# Patient Record
(demographics unavailable — no encounter records)

---

## 2025-02-05 NOTE — ASSESSMENT
[Palliative Care Plan] : not applicable at this time [FreeTextEntry1] : August 09, 2024 -   Patient is an 84 y/o elderly Post menopausal black female, w h/o HTN, HLD, Cerebral microvascular disease, CAD, CHF, Hypokalemia, Atrial Fibrillation on low dose Eliquis, Pulmonary Embolus & Left Leg DVT March 3, 2023 @ [facility], Anemia, h/o blood transfusion.  Labs from April 19, 2024 - Hgb = 10.8, MCV = 81.0 w Normal diff   Iron studies WNL, Ferritin = 432 (H) - remains stable likely reactive   Today's CBC - WBC = 9.07, Hgb = 11.0, PLT = 200 000, MCV = 81.2 - results d/w pt copy given Ferritin, Iron studies, B12/ Folate results pending  1) Normocytic Anemia - Multifactorial - STEVE, AOCD - Continue Oral Fe 1 tab po qd w Vitamin C / OJ or citrus, incorporate Iron rich vegetables with citrus absorption, other iron rich foods in diet. Continue Folic Acid daily as directed. Prescriptions w refills sent to local pharmacy  2) MIPS Measures Questionnaire Completed    3) Pulmonary Embolus / Left Leg DVT March 3, 2023 & Atrial Fibrillation - continue Eliquis 2.5 mg PO bid  4) PCP and Cardiology & Other providers f/u was advised - as per their discretion.   Medication compliance advised, all questions, concerns were addressed with patient during this visit to her satisfaction, she verbalized understanding. RTC in 6 months or sooner as needed. Case management, care plan d/w Dr. Gentry Atwood  _________________ February 05, 2025 - Patient is an 84 y/o elderly Post menopausal black female, w h/o HTN, HLD, Cerebral microvascular disease, CAD on Brillinta, CHF, Hypokalemia, Atrial Fibrillation on low dose Eliquis, Pulmonary Embolus & Left Leg DVT March 3, 2023 @ [facility], Anemia, h/o blood transfusion.  Labs from August 09, 2024 - CBC - WBC = 9.07, Hgb = 11.0, PLT = 200 000, MCV = 81.2 Ferritin = 330, Iron = 57, TIBC = 284, % Fe Saturation = 20%,   B12 = 692,  Folate > 20.0  Today's CBC > WBC = 7.54, Hgb = 11.7 PLT = 199 000, MCV = 81.4 w Normal Diff &  Normal Retic Count Ferritin, Iron studies, B12 / Folate results pending  1) Anemia resolved - Continue Oral iron w Vitamin C, iron, B12 & Folate rich foods  Prescriptions for Iron & Vitamin C renewed  2) Pulmonary Embolus / Left Leg DVT March 3, 2023 - completed course of Anticoagulation   3) h/o CAD, CHF, Atrial Fibrillation on Brillinta & low dose Eliquis - continue Eliquis 2.5 mg PO bid - f/u w Cardiology  4) PCP and Cardiology & Other providers f/u was advised - as per their discretion.   Medication compliance advised, all questions, concerns were addressed with patient during this visit to her satisfaction, she verbalized understanding. RTC in 6 months or sooner as needed. Case management, care plan d/w Dr. Gentry Atwood

## 2025-02-05 NOTE — PHYSICAL EXAM
[Ambulatory and capable of all self care but unable to carry out any work activities] : Status 2- Ambulatory and capable of all self care but unable to carry out any work activities. Up and about more than 50% of waking hours [Normal] : affect appropriate [de-identified] : pt wore a face mask during visit, weight remains stable, [de-identified] : wears eyeglasses

## 2025-02-05 NOTE — HISTORY OF PRESENT ILLNESS
[Date: ____________] : Patient's last distress assessment performed on [unfilled]. [0 - No Distress] : Distress Level: 0 [90: Able to carry normal activity; minor signs or symptoms of disease.] : 90: Able to carry normal activity; minor signs or symptoms of disease.  [ECOG Performance Status: 1 - Restricted in physically strenuous activity but ambulatory and able to carry out work of a light or sedentary nature] : Performance Status: 1 - Restricted in physically strenuous activity but ambulatory and able to carry out work of a light or sedentary nature, e.g., light house work, office work [de-identified] : Deedee Oneill is a 82 year old seen today for first [facility] hematology visit: \par  02/10/2023 admission to [facility] for treatment of congestive heart failure. She was first told of hypertension, high cholesterol and atrial fibrillation at that time. No myocardial infarction she was diagnosed to have coronary artery disease. 2 coronary stents were placed. She had a left lower leg deep venous thrombosis and a unilateral pulmonary embolus left lower lobe and possibly in right lower lobe. CT of brain showed microvascular ischemia\par  She did not have COVID 19.\par  She received 3 blood transfusions. No prior blood transfusions\par  pulmonary emboli treated with anticoagulation. Discharge from [facility] 03/03/2023 and transferred to [facility] ; she remained in rehabilitation 04/04/2023.\par  There is no family history of blood clotting abnormality [FreeTextEntry1] : Oral Iron  [de-identified] : see hpi  June 16, 2023 - 2nd visit - Seen in a f/u visit today, ambulates with a walker, presented to the office by herself, son waiting outside drove her to this apptn. She feels well remains on Eliquis, Folic Acid, oral Fe takes it daily with crackers. Denies bleeding, bruising, interval change in medical status, febrile illness.  September 15, 2023 - 3rd Visit - Seen in a f/u visit, presented to the office ambulating with a walker, a family relative drove her to this appointment.  She has been feeling well, no falls, no bleeding, bruising, febrile illness, hospitalizations, change in medications, swollen glands, dizziness, fatigue, weight loss.  She remains on Oral Fe with OJ, Folic Acid.  PE, Left Lower Leg DVT on March 3, 2023 & has h/o pAfib remains on Eliquis 2.5 mg PO bid. Also on Brilinta 90 mg PO bid for CAD.   She was seen by PCP Dr. Wally Wilkinson yesterday and medications were renewed. She has a referral to see a Cardiologist has not made an appointment as of yet.   December 15, 2023 - Seen in a f/u visit today daughter drove her to the appointment waiting outside. Patient ambulates with a walker. Feels well denies any falls, bleeding, bruising, febrile illness, interval change in medical status, hospitalization.  She reports compliance with taking oral iron with OJ also takes Vitamin C separately, She remains on Folic Acid, Eliquis & Brillinta for CAD, Afib  Has not seen a Cardiologist as of yet plans to make an apptn next year as her new insurance will go into effect on January 2024.  April 19, 2024 - Seen in a f/u visit today, ambulates with a walker for assistance. Feels well, lives with daughter.  No interval change in medical status, febrile illness, hospitalization, bleeding, bruising, blood transfusions. Weight remains stable, reports compliance with oral iron with OJ / Vitamin C.  Remains on w h/o CAD, Afib on Brilinta & Eliquis.  _____ August 09, 2204 - Seen in a f/u visit today, ambulated with a cane. Denies interval change in medical status, hospitalization, blood transfusion, bleeding, bruising, febrile illness.  Reports compliance with taking medications, and follow up with physicians. Came to the visit today by [employer], she has self surrendered her drivers license.  ___________ February 05, 2025 - Seen in a f/u visit today. Feels well, reports no febrile illness, hospitalization, interval change in medical status, no bleeding, bruising, falls,  able to perform ADLs remains active, actively participates with family events, and planning visiting her family during early spring.

## 2025-02-05 NOTE — REASON FOR VISIT
[Follow-Up Visit] : a follow-up visit for [Blood Count Assessment] : blood count assessment [Family Member] : family member [Other: _____] : [unfilled] [FreeTextEntry2] : Anemia STEVE

## 2025-02-05 NOTE — REVIEW OF SYSTEMS
[Recent Change In Weight] : ~T recent weight change [Palpitations] : palpitations [Difficulty Walking] : difficulty walking [Negative] : Heme/Lymph [Fever] : no fever [Chills] : no chills [Night Sweats] : no night sweats [Fatigue] : no fatigue [Dry Eyes] : no dryness of the eyes [Dysphagia] : no dysphagia [Loss of Hearing] : no loss of hearing [Nosebleeds] : no nosebleeds [Hoarseness] : no hoarseness [Odynophagia] : no odynophagia [Chest Pain] : no chest pain [Leg Claudication] : no intermittent leg claudication [Lower Ext Edema] : no lower extremity edema [Shortness Of Breath] : no shortness of breath [Wheezing] : no wheezing [Cough] : no cough [SOB on Exertion] : no shortness of breath during exertion [Abdominal Pain] : no abdominal pain [Vomiting] : no vomiting [Constipation] : no constipation [Dysuria] : no dysuria [Incontinence] : no incontinence [Vaginal Discharge] : no vaginal discharge [Dysmenorrhea/Abn Vaginal Bleeding] : no dysmenorrhea/abnormal vaginal bleeding [Joint Pain] : no joint pain [Joint Stiffness] : no joint stiffness [Muscle Pain] : no muscle pain [Muscle Weakness] : no muscle weakness [Skin Rash] : no skin rash [Skin Wound] : no skin wound [Confused] : no confusion [Dizziness] : no dizziness [Fainting] : no fainting [FreeTextEntry2] : weight remains stable  [FreeTextEntry3] : no visual disturbances, blurred vision  [FreeTextEntry5] : h/o CAD, Afib on Brillinta & Eliquis low dose  [FreeTextEntry6] : h/o PE  [FreeTextEntry7] : denies GIB, no nausea, vomiting [FreeTextEntry8] : no hematuria  [FreeTextEntry9] : ambulates with a cane reports no falls  [de-identified] :  reports no falls, no headaches, visual disturbances, gait issues

## 2025-02-05 NOTE — ASSESSMENT
[Palliative Care Plan] : not applicable at this time [FreeTextEntry1] : August 09, 2024 -   Patient is an 82 y/o elderly Post menopausal black female, w h/o HTN, HLD, Cerebral microvascular disease, CAD, CHF, Hypokalemia, Atrial Fibrillation on low dose Eliquis, Pulmonary Embolus & Left Leg DVT March 3, 2023 @ [facility], Anemia, h/o blood transfusion.  Labs from April 19, 2024 - Hgb = 10.8, MCV = 81.0 w Normal diff   Iron studies WNL, Ferritin = 432 (H) - remains stable likely reactive   Today's CBC - WBC = 9.07, Hgb = 11.0, PLT = 200 000, MCV = 81.2 - results d/w pt copy given Ferritin, Iron studies, B12/ Folate results pending  1) Normocytic Anemia - Multifactorial - STEVE, AOCD - Continue Oral Fe 1 tab po qd w Vitamin C / OJ or citrus, incorporate Iron rich vegetables with citrus absorption, other iron rich foods in diet. Continue Folic Acid daily as directed. Prescriptions w refills sent to local pharmacy  2) MIPS Measures Questionnaire Completed    3) Pulmonary Embolus / Left Leg DVT March 3, 2023 & Atrial Fibrillation - continue Eliquis 2.5 mg PO bid  4) PCP and Cardiology & Other providers f/u was advised - as per their discretion.   Medication compliance advised, all questions, concerns were addressed with patient during this visit to her satisfaction, she verbalized understanding. RTC in 6 months or sooner as needed. Case management, care plan d/w Dr. Gentry Atwood  _________________ February 05, 2025 - Patient is an 82 y/o elderly Post menopausal black female, w h/o HTN, HLD, Cerebral microvascular disease, CAD on Brillinta, CHF, Hypokalemia, Atrial Fibrillation on low dose Eliquis, Pulmonary Embolus & Left Leg DVT March 3, 2023 @ [facility], Anemia, h/o blood transfusion.  Labs from August 09, 2024 - CBC - WBC = 9.07, Hgb = 11.0, PLT = 200 000, MCV = 81.2 Ferritin = 330, Iron = 57, TIBC = 284, % Fe Saturation = 20%,   B12 = 692,  Folate > 20.0  Today's CBC > WBC = 7.54, Hgb = 11.7 PLT = 199 000, MCV = 81.4 w Normal Diff &  Normal Retic Count Ferritin, Iron studies, B12 / Folate results pending  1) Anemia resolved - Continue Oral iron w Vitamin C, iron, B12 & Folate rich foods  Prescriptions for Iron & Vitamin C renewed  2) Pulmonary Embolus / Left Leg DVT March 3, 2023 - completed course of Anticoagulation   3) h/o CAD, CHF, Atrial Fibrillation on Brillinta & low dose Eliquis - continue Eliquis 2.5 mg PO bid - f/u w Cardiology  4) PCP and Cardiology & Other providers f/u was advised - as per their discretion.   Medication compliance advised, all questions, concerns were addressed with patient during this visit to her satisfaction, she verbalized understanding. RTC in 6 months or sooner as needed. Case management, care plan d/w Dr. Gentry Atwood

## 2025-02-05 NOTE — REVIEW OF SYSTEMS
[Recent Change In Weight] : ~T recent weight change [Palpitations] : palpitations [Difficulty Walking] : difficulty walking [Negative] : Heme/Lymph [Fever] : no fever [Chills] : no chills [Night Sweats] : no night sweats [Fatigue] : no fatigue [Dry Eyes] : no dryness of the eyes [Dysphagia] : no dysphagia [Loss of Hearing] : no loss of hearing [Nosebleeds] : no nosebleeds [Hoarseness] : no hoarseness [Odynophagia] : no odynophagia [Chest Pain] : no chest pain [Leg Claudication] : no intermittent leg claudication [Lower Ext Edema] : no lower extremity edema [Shortness Of Breath] : no shortness of breath [Wheezing] : no wheezing [Cough] : no cough [SOB on Exertion] : no shortness of breath during exertion [Abdominal Pain] : no abdominal pain [Vomiting] : no vomiting [Constipation] : no constipation [Dysuria] : no dysuria [Incontinence] : no incontinence [Vaginal Discharge] : no vaginal discharge [Dysmenorrhea/Abn Vaginal Bleeding] : no dysmenorrhea/abnormal vaginal bleeding [Joint Pain] : no joint pain [Joint Stiffness] : no joint stiffness [Muscle Pain] : no muscle pain [Muscle Weakness] : no muscle weakness [Skin Rash] : no skin rash [Skin Wound] : no skin wound [Confused] : no confusion [Dizziness] : no dizziness [Fainting] : no fainting [FreeTextEntry2] : weight remains stable  [FreeTextEntry3] : no visual disturbances, blurred vision  [FreeTextEntry5] : h/o CAD, Afib on Brillinta & Eliquis low dose  [FreeTextEntry6] : h/o PE  [FreeTextEntry7] : denies GIB, no nausea, vomiting [FreeTextEntry8] : no hematuria  [FreeTextEntry9] : ambulates with a cane reports no falls  [de-identified] :  reports no falls, no headaches, visual disturbances, gait issues

## 2025-02-05 NOTE — PHYSICAL EXAM
[Ambulatory and capable of all self care but unable to carry out any work activities] : Status 2- Ambulatory and capable of all self care but unable to carry out any work activities. Up and about more than 50% of waking hours [Normal] : affect appropriate [de-identified] : pt wore a face mask during visit, weight remains stable, [de-identified] : wears eyeglasses

## 2025-02-05 NOTE — HISTORY OF PRESENT ILLNESS
[Date: ____________] : Patient's last distress assessment performed on [unfilled]. [0 - No Distress] : Distress Level: 0 [90: Able to carry normal activity; minor signs or symptoms of disease.] : 90: Able to carry normal activity; minor signs or symptoms of disease.  [ECOG Performance Status: 1 - Restricted in physically strenuous activity but ambulatory and able to carry out work of a light or sedentary nature] : Performance Status: 1 - Restricted in physically strenuous activity but ambulatory and able to carry out work of a light or sedentary nature, e.g., light house work, office work [de-identified] : Deedee Oneill is a 82 year old seen today for first [facility] hematology visit: \par  02/10/2023 admission to [facility] for treatment of congestive heart failure. She was first told of hypertension, high cholesterol and atrial fibrillation at that time. No myocardial infarction she was diagnosed to have coronary artery disease. 2 coronary stents were placed. She had a left lower leg deep venous thrombosis and a unilateral pulmonary embolus left lower lobe and possibly in right lower lobe. CT of brain showed microvascular ischemia\par  She did not have COVID 19.\par  She received 3 blood transfusions. No prior blood transfusions\par  pulmonary emboli treated with anticoagulation. Discharge from [facility] 03/03/2023 and transferred to [facility] ; she remained in rehabilitation 04/04/2023.\par  There is no family history of blood clotting abnormality [FreeTextEntry1] : Oral Iron  [de-identified] : see hpi  June 16, 2023 - 2nd visit - Seen in a f/u visit today, ambulates with a walker, presented to the office by herself, son waiting outside drove her to this apptn. She feels well remains on Eliquis, Folic Acid, oral Fe takes it daily with crackers. Denies bleeding, bruising, interval change in medical status, febrile illness.  September 15, 2023 - 3rd Visit - Seen in a f/u visit, presented to the office ambulating with a walker, a family relative drove her to this appointment.  She has been feeling well, no falls, no bleeding, bruising, febrile illness, hospitalizations, change in medications, swollen glands, dizziness, fatigue, weight loss.  She remains on Oral Fe with OJ, Folic Acid.  PE, Left Lower Leg DVT on March 3, 2023 & has h/o pAfib remains on Eliquis 2.5 mg PO bid. Also on Brilinta 90 mg PO bid for CAD.   She was seen by PCP Dr. Wally Wilkinson yesterday and medications were renewed. She has a referral to see a Cardiologist has not made an appointment as of yet.   December 15, 2023 - Seen in a f/u visit today daughter drove her to the appointment waiting outside. Patient ambulates with a walker. Feels well denies any falls, bleeding, bruising, febrile illness, interval change in medical status, hospitalization.  She reports compliance with taking oral iron with OJ also takes Vitamin C separately, She remains on Folic Acid, Eliquis & Brillinta for CAD, Afib  Has not seen a Cardiologist as of yet plans to make an apptn next year as her new insurance will go into effect on January 2024.  April 19, 2024 - Seen in a f/u visit today, ambulates with a walker for assistance. Feels well, lives with daughter.  No interval change in medical status, febrile illness, hospitalization, bleeding, bruising, blood transfusions. Weight remains stable, reports compliance with oral iron with OJ / Vitamin C.  Remains on w h/o CAD, Afib on Brilinta & Eliquis.  _____ August 09, 2204 - Seen in a f/u visit today, ambulated with a cane. Denies interval change in medical status, hospitalization, blood transfusion, bleeding, bruising, febrile illness.  Reports compliance with taking medications, and follow up with physicians. Came to the visit today by [employer], she has self surrendered her drivers license.  ___________ February 05, 2025 - Seen in a f/u visit today. Feels well, reports no febrile illness, hospitalization, interval change in medical status, no bleeding, bruising, falls,  able to perform ADLs remains active, actively participates with family events, and planning visiting her family during early spring.

## 2025-02-05 NOTE — RESULTS/DATA
[FreeTextEntry1] : review of information in the EM HR CBC WBC 6.67 HGB 9.9 MCV 82  000 copy of the report printed and given to doyle with an explanation April 12, 2023 - Anticardiolipin Antibody - Negative, APC Resistance Assay - WNL   June 16, 2023 - CBC - WBC = 6.04, Hgb = 10.4, PLT = 217, MCV = 76.1 with normal differential - d/w pt copy provided   Dec 15, 2023 CBC - WBC = 5.9 Hgb = 10.3, PLT = 242, MCV = 79.2 with normal differential - d/w pt copy provided   April 19, 2024 - CBC - WBC = 8.06, Hgb = 10.8, PLT = 232, MCV = 81.0 w Normal diff - d/w pt copy given  Ferritin = 432 (H),  Iron studies WNL ,  _________ August 09, 2024 - CBC - WBC = 9.07, Hgb = 11.0, PLT = 200 000, MCV = 81.2 Ferritin = 330, Iron = 57, TIBC = 284, % Fe Saturation = 20%,   B12 = 692,  Folate > 20.0 __________ February 05, 2025 - CBC > WBC = 7.54, Hgb = 11.7 PLT = 199 000, MCV = 81.4  w Normal Diff &  Normal Retic Count Ferritin, Iron studies, B12 / Folate results pending